# Patient Record
Sex: FEMALE | Race: WHITE | Employment: OTHER | URBAN - METROPOLITAN AREA
[De-identification: names, ages, dates, MRNs, and addresses within clinical notes are randomized per-mention and may not be internally consistent; named-entity substitution may affect disease eponyms.]

---

## 2017-07-17 RX ORDER — SODIUM CHLORIDE, SODIUM LACTATE, POTASSIUM CHLORIDE, CALCIUM CHLORIDE 600; 310; 30; 20 MG/100ML; MG/100ML; MG/100ML; MG/100ML
INJECTION, SOLUTION INTRAVENOUS CONTINUOUS
Status: CANCELLED | OUTPATIENT
Start: 2017-07-17

## 2017-07-19 PROCEDURE — 87081 CULTURE SCREEN ONLY: CPT | Performed by: ORTHOPAEDIC SURGERY

## 2017-07-19 PROCEDURE — 81003 URINALYSIS AUTO W/O SCOPE: CPT | Performed by: FAMILY MEDICINE

## 2017-07-24 NOTE — OR NURSING
Called ,pcp office and spoke to Casa  Requested confirmed EKG tracing done 7/19/17 be faxed as soon as available.

## 2017-07-26 ENCOUNTER — APPOINTMENT (OUTPATIENT)
Dept: GENERAL RADIOLOGY | Facility: HOSPITAL | Age: 67
DRG: 457 | End: 2017-07-26
Attending: ORTHOPAEDIC SURGERY
Payer: MEDICARE

## 2017-07-26 ENCOUNTER — HOSPITAL ENCOUNTER (INPATIENT)
Facility: HOSPITAL | Age: 67
LOS: 4 days | Discharge: NURSING, REHAB OR LONG-TERM CARE | DRG: 457 | End: 2017-07-30
Attending: ORTHOPAEDIC SURGERY | Admitting: ORTHOPAEDIC SURGERY
Payer: MEDICARE

## 2017-07-26 ENCOUNTER — SURGERY (OUTPATIENT)
Age: 67
End: 2017-07-26

## 2017-07-26 DIAGNOSIS — M48.061 LUMBAR STENOSIS: Primary | ICD-10-CM

## 2017-07-26 LAB
ERYTHROCYTE [DISTWIDTH] IN BLOOD BY AUTOMATED COUNT: 13.6 % (ref 11.5–16)
HCT VFR BLD AUTO: 32.1 % (ref 34–50)
HGB BLD-MCNC: 10.2 G/DL (ref 12–16)
MCH RBC QN AUTO: 28.6 PG (ref 27–33.2)
MCHC RBC AUTO-ENTMCNC: 31.8 G/DL (ref 31–37)
MCV RBC AUTO: 89.9 FL (ref 81–100)
PLATELET # BLD AUTO: 227 10(3)UL (ref 150–450)
RBC # BLD AUTO: 3.57 X10(6)UL (ref 3.8–5.1)
RED CELL DISTRIBUTION WIDTH-SD: 44.4 FL (ref 35.1–46.3)
WBC # BLD AUTO: 14.6 X10(3) UL (ref 4–13)

## 2017-07-26 PROCEDURE — 95940 IONM IN OPERATNG ROOM 15 MIN: CPT | Performed by: ORTHOPAEDIC SURGERY

## 2017-07-26 PROCEDURE — 88304 TISSUE EXAM BY PATHOLOGIST: CPT | Performed by: ORTHOPAEDIC SURGERY

## 2017-07-26 PROCEDURE — 0ST40ZZ RESECTION OF LUMBOSACRAL DISC, OPEN APPROACH: ICD-10-PCS | Performed by: ORTHOPAEDIC SURGERY

## 2017-07-26 PROCEDURE — 76000 FLUOROSCOPY <1 HR PHYS/QHP: CPT | Performed by: ORTHOPAEDIC SURGERY

## 2017-07-26 PROCEDURE — 07DS3ZZ EXTRACTION OF VERTEBRAL BONE MARROW, PERCUTANEOUS APPROACH: ICD-10-PCS | Performed by: ORTHOPAEDIC SURGERY

## 2017-07-26 PROCEDURE — 94664 DEMO&/EVAL PT USE INHALER: CPT

## 2017-07-26 PROCEDURE — 88311 DECALCIFY TISSUE: CPT | Performed by: ORTHOPAEDIC SURGERY

## 2017-07-26 PROCEDURE — 85027 COMPLETE CBC AUTOMATED: CPT | Performed by: PHYSICIAN ASSISTANT

## 2017-07-26 PROCEDURE — 4A11X4G MONITORING OF PERIPHERAL NERVOUS ELECTRICAL ACTIVITY, INTRAOPERATIVE, EXTERNAL APPROACH: ICD-10-PCS | Performed by: ORTHOPAEDIC SURGERY

## 2017-07-26 PROCEDURE — 0ST20ZZ RESECTION OF LUMBAR VERTEBRAL DISC, OPEN APPROACH: ICD-10-PCS | Performed by: ORTHOPAEDIC SURGERY

## 2017-07-26 PROCEDURE — 00BY0ZZ EXCISION OF LUMBAR SPINAL CORD, OPEN APPROACH: ICD-10-PCS | Performed by: ORTHOPAEDIC SURGERY

## 2017-07-26 PROCEDURE — 0SG10AJ FUSION OF 2 OR MORE LUMBAR VERTEBRAL JOINTS WITH INTERBODY FUSION DEVICE, POSTERIOR APPROACH, ANTERIOR COLUMN, OPEN APPROACH: ICD-10-PCS | Performed by: ORTHOPAEDIC SURGERY

## 2017-07-26 DEVICE — RELINE MAS RED SCREW 6.5X40 2C: Type: IMPLANTABLE DEVICE | Site: BACK | Status: FUNCTIONAL

## 2017-07-26 DEVICE — RELINE MAS TI ROD 5.5X85 LRDTC: Type: IMPLANTABLE DEVICE | Status: FUNCTIONAL

## 2017-07-26 DEVICE — BONE GRAFT KIT 7510200 INFUSE SMALL
Type: IMPLANTABLE DEVICE | Status: FUNCTIONAL
Brand: INFUSE® BONE GRAFT

## 2017-07-26 DEVICE — RELINE MAS RED SCREW 7.5X45 2C: Type: IMPLANTABLE DEVICE | Site: BACK | Status: FUNCTIONAL

## 2017-07-26 DEVICE — RELINE LCK SCRW 5.5 OPEN TULIP: Type: IMPLANTABLE DEVICE | Site: BACK | Status: FUNCTIONAL

## 2017-07-26 DEVICE — RELINE MAS MOD SCREW 7.5X45 2C: Type: IMPLANTABLE DEVICE | Site: BACK | Status: FUNCTIONAL

## 2017-07-26 DEVICE — OBLIQUE TLIF 8X10X30MM 4D: Type: IMPLANTABLE DEVICE | Status: FUNCTIONAL

## 2017-07-26 DEVICE — RELINE MAS MOD REDUCTION EXT: Type: IMPLANTABLE DEVICE | Site: BACK | Status: FUNCTIONAL

## 2017-07-26 DEVICE — RELINE MAS RED SCREW 6.5X45 2C: Type: IMPLANTABLE DEVICE | Site: BACK | Status: FUNCTIONAL

## 2017-07-26 DEVICE — RELINE MAS MOD SCREW 6.5X40 2C: Type: IMPLANTABLE DEVICE | Site: BACK | Status: FUNCTIONAL

## 2017-07-26 DEVICE — RELINE MAS MOD SCREW 6.5X45 2C: Type: IMPLANTABLE DEVICE | Site: BACK | Status: FUNCTIONAL

## 2017-07-26 DEVICE — OSTEOCEL PRO BONE MATRIX LG: Type: IMPLANTABLE DEVICE | Site: BACK | Status: FUNCTIONAL

## 2017-07-26 DEVICE — OBLIQUE TLIF 8X10X30MM 12D: Type: IMPLANTABLE DEVICE | Status: FUNCTIONAL

## 2017-07-26 RX ORDER — OXYCODONE HYDROCHLORIDE 5 MG/1
5 TABLET ORAL EVERY 4 HOURS PRN
Status: DISCONTINUED | OUTPATIENT
Start: 2017-07-26 | End: 2017-07-30

## 2017-07-26 RX ORDER — ATENOLOL 25 MG/1
25 TABLET ORAL DAILY
COMMUNITY
End: 2017-12-19

## 2017-07-26 RX ORDER — ONDANSETRON 2 MG/ML
4 INJECTION INTRAMUSCULAR; INTRAVENOUS ONCE
Status: COMPLETED | OUTPATIENT
Start: 2017-07-26 | End: 2017-07-26

## 2017-07-26 RX ORDER — PANTOPRAZOLE SODIUM 40 MG/1
40 TABLET, DELAYED RELEASE ORAL
Status: DISCONTINUED | OUTPATIENT
Start: 2017-07-27 | End: 2017-07-30

## 2017-07-26 RX ORDER — NALOXONE HYDROCHLORIDE 0.4 MG/ML
80 INJECTION, SOLUTION INTRAMUSCULAR; INTRAVENOUS; SUBCUTANEOUS AS NEEDED
Status: DISCONTINUED | OUTPATIENT
Start: 2017-07-26 | End: 2017-07-26 | Stop reason: HOSPADM

## 2017-07-26 RX ORDER — MEPERIDINE HYDROCHLORIDE 25 MG/ML
12.5 INJECTION INTRAMUSCULAR; INTRAVENOUS; SUBCUTANEOUS AS NEEDED
Status: DISCONTINUED | OUTPATIENT
Start: 2017-07-26 | End: 2017-07-26 | Stop reason: HOSPADM

## 2017-07-26 RX ORDER — BUPIVACAINE HYDROCHLORIDE AND EPINEPHRINE 5; 5 MG/ML; UG/ML
INJECTION, SOLUTION EPIDURAL; INTRACAUDAL; PERINEURAL AS NEEDED
Status: DISCONTINUED | OUTPATIENT
Start: 2017-07-26 | End: 2017-07-26 | Stop reason: HOSPADM

## 2017-07-26 RX ORDER — POLYETHYLENE GLYCOL 3350 17 G/17G
17 POWDER, FOR SOLUTION ORAL DAILY PRN
Status: DISCONTINUED | OUTPATIENT
Start: 2017-07-26 | End: 2017-07-30

## 2017-07-26 RX ORDER — HYDROMORPHONE HYDROCHLORIDE 1 MG/ML
0.8 INJECTION, SOLUTION INTRAMUSCULAR; INTRAVENOUS; SUBCUTANEOUS EVERY 2 HOUR PRN
Status: DISCONTINUED | OUTPATIENT
Start: 2017-07-26 | End: 2017-07-30

## 2017-07-26 RX ORDER — HYDROMORPHONE HYDROCHLORIDE 1 MG/ML
0.4 INJECTION, SOLUTION INTRAMUSCULAR; INTRAVENOUS; SUBCUTANEOUS EVERY 2 HOUR PRN
Status: DISCONTINUED | OUTPATIENT
Start: 2017-07-26 | End: 2017-07-30

## 2017-07-26 RX ORDER — ONDANSETRON 2 MG/ML
4 INJECTION INTRAMUSCULAR; INTRAVENOUS AS NEEDED
Status: DISCONTINUED | OUTPATIENT
Start: 2017-07-26 | End: 2017-07-26 | Stop reason: HOSPADM

## 2017-07-26 RX ORDER — FENOFIBRATE 134 MG/1
134 CAPSULE ORAL
COMMUNITY
End: 2017-12-19

## 2017-07-26 RX ORDER — MULTIVITAMIN WITH FOLIC ACID 400 MCG
1 TABLET ORAL DAILY
COMMUNITY

## 2017-07-26 RX ORDER — HYDROMORPHONE HYDROCHLORIDE 1 MG/ML
INJECTION, SOLUTION INTRAMUSCULAR; INTRAVENOUS; SUBCUTANEOUS
Status: COMPLETED
Start: 2017-07-26 | End: 2017-07-26

## 2017-07-26 RX ORDER — DEXAMETHASONE SODIUM PHOSPHATE 4 MG/ML
4 VIAL (ML) INJECTION AS NEEDED
Status: DISCONTINUED | OUTPATIENT
Start: 2017-07-26 | End: 2017-07-26 | Stop reason: HOSPADM

## 2017-07-26 RX ORDER — SODIUM CHLORIDE, SODIUM LACTATE, POTASSIUM CHLORIDE, CALCIUM CHLORIDE 600; 310; 30; 20 MG/100ML; MG/100ML; MG/100ML; MG/100ML
INJECTION, SOLUTION INTRAVENOUS CONTINUOUS
Status: DISCONTINUED | OUTPATIENT
Start: 2017-07-26 | End: 2017-07-26

## 2017-07-26 RX ORDER — DIPHENHYDRAMINE HYDROCHLORIDE 50 MG/ML
12.5 INJECTION INTRAMUSCULAR; INTRAVENOUS AS NEEDED
Status: DISCONTINUED | OUTPATIENT
Start: 2017-07-26 | End: 2017-07-26 | Stop reason: HOSPADM

## 2017-07-26 RX ORDER — HYDROMORPHONE HYDROCHLORIDE 1 MG/ML
0.2 INJECTION, SOLUTION INTRAMUSCULAR; INTRAVENOUS; SUBCUTANEOUS EVERY 2 HOUR PRN
Status: DISCONTINUED | OUTPATIENT
Start: 2017-07-26 | End: 2017-07-30

## 2017-07-26 RX ORDER — MIDAZOLAM HYDROCHLORIDE 1 MG/ML
INJECTION INTRAMUSCULAR; INTRAVENOUS
Status: COMPLETED
Start: 2017-07-26 | End: 2017-07-26

## 2017-07-26 RX ORDER — DOCUSATE SODIUM 100 MG/1
100 CAPSULE, LIQUID FILLED ORAL 2 TIMES DAILY
Status: DISCONTINUED | OUTPATIENT
Start: 2017-07-26 | End: 2017-07-30

## 2017-07-26 RX ORDER — METOCLOPRAMIDE HYDROCHLORIDE 5 MG/ML
10 INJECTION INTRAMUSCULAR; INTRAVENOUS AS NEEDED
Status: DISCONTINUED | OUTPATIENT
Start: 2017-07-26 | End: 2017-07-26 | Stop reason: HOSPADM

## 2017-07-26 RX ORDER — METOCLOPRAMIDE HYDROCHLORIDE 5 MG/ML
5 INJECTION INTRAMUSCULAR; INTRAVENOUS EVERY 6 HOURS PRN
Status: DISCONTINUED | OUTPATIENT
Start: 2017-07-26 | End: 2017-07-30

## 2017-07-26 RX ORDER — SODIUM PHOSPHATE, DIBASIC AND SODIUM PHOSPHATE, MONOBASIC 7; 19 G/133ML; G/133ML
1 ENEMA RECTAL ONCE AS NEEDED
Status: DISCONTINUED | OUTPATIENT
Start: 2017-07-26 | End: 2017-07-30

## 2017-07-26 RX ORDER — BACITRACIN 50000 [USP'U]/1
INJECTION, POWDER, LYOPHILIZED, FOR SOLUTION INTRAMUSCULAR AS NEEDED
Status: DISCONTINUED | OUTPATIENT
Start: 2017-07-26 | End: 2017-07-26 | Stop reason: HOSPADM

## 2017-07-26 RX ORDER — SODIUM CHLORIDE 9 MG/ML
INJECTION, SOLUTION INTRAVENOUS CONTINUOUS
Status: DISCONTINUED | OUTPATIENT
Start: 2017-07-26 | End: 2017-07-30

## 2017-07-26 RX ORDER — HYDROMORPHONE HYDROCHLORIDE 1 MG/ML
0.4 INJECTION, SOLUTION INTRAMUSCULAR; INTRAVENOUS; SUBCUTANEOUS EVERY 5 MIN PRN
Status: DISCONTINUED | OUTPATIENT
Start: 2017-07-26 | End: 2017-07-26 | Stop reason: HOSPADM

## 2017-07-26 RX ORDER — SERTRALINE HYDROCHLORIDE 100 MG/1
100 TABLET, FILM COATED ORAL DAILY
COMMUNITY
End: 2017-12-19

## 2017-07-26 RX ORDER — SERTRALINE HYDROCHLORIDE 100 MG/1
100 TABLET, FILM COATED ORAL DAILY
Status: DISCONTINUED | OUTPATIENT
Start: 2017-07-27 | End: 2017-07-30

## 2017-07-26 RX ORDER — ATENOLOL 25 MG/1
25 TABLET ORAL DAILY
Status: DISCONTINUED | OUTPATIENT
Start: 2017-07-26 | End: 2017-07-30

## 2017-07-26 RX ORDER — BISACODYL 10 MG
10 SUPPOSITORY, RECTAL RECTAL
Status: DISCONTINUED | OUTPATIENT
Start: 2017-07-26 | End: 2017-07-30

## 2017-07-26 RX ORDER — DEXAMETHASONE SODIUM PHOSPHATE 4 MG/ML
10 VIAL (ML) INJECTION EVERY 8 HOURS
Status: COMPLETED | OUTPATIENT
Start: 2017-07-26 | End: 2017-07-27

## 2017-07-26 RX ORDER — LEVOTHYROXINE SODIUM 0.03 MG/1
25 TABLET ORAL
Status: DISCONTINUED | OUTPATIENT
Start: 2017-07-27 | End: 2017-07-30

## 2017-07-26 RX ORDER — SERTRALINE HYDROCHLORIDE 25 MG/1
25 TABLET, FILM COATED ORAL DAILY
Status: DISCONTINUED | OUTPATIENT
Start: 2017-07-27 | End: 2017-07-30

## 2017-07-26 RX ORDER — SERTRALINE HYDROCHLORIDE 25 MG/1
25 TABLET, FILM COATED ORAL DAILY
COMMUNITY
End: 2017-12-19

## 2017-07-26 RX ORDER — GABAPENTIN 600 MG/1
600 TABLET ORAL ONCE
Status: COMPLETED | OUTPATIENT
Start: 2017-07-26 | End: 2017-07-26

## 2017-07-26 RX ORDER — DIPHENHYDRAMINE HCL 25 MG
25 CAPSULE ORAL EVERY 4 HOURS PRN
Status: DISCONTINUED | OUTPATIENT
Start: 2017-07-26 | End: 2017-07-30

## 2017-07-26 RX ORDER — FENOFIBRATE 134 MG/1
134 CAPSULE ORAL
Status: DISCONTINUED | OUTPATIENT
Start: 2017-07-27 | End: 2017-07-30

## 2017-07-26 RX ORDER — MIDAZOLAM HYDROCHLORIDE 1 MG/ML
1 INJECTION INTRAMUSCULAR; INTRAVENOUS EVERY 5 MIN PRN
Status: DISCONTINUED | OUTPATIENT
Start: 2017-07-26 | End: 2017-07-26 | Stop reason: HOSPADM

## 2017-07-26 RX ORDER — ATORVASTATIN CALCIUM 40 MG/1
80 TABLET, FILM COATED ORAL NIGHTLY
Status: DISCONTINUED | OUTPATIENT
Start: 2017-07-26 | End: 2017-07-30

## 2017-07-26 RX ORDER — DIAZEPAM 5 MG/1
5 TABLET ORAL EVERY 8 HOURS PRN
Status: DISCONTINUED | OUTPATIENT
Start: 2017-07-26 | End: 2017-07-28

## 2017-07-26 RX ORDER — ONDANSETRON 2 MG/ML
4 INJECTION INTRAMUSCULAR; INTRAVENOUS EVERY 4 HOURS PRN
Status: ACTIVE | OUTPATIENT
Start: 2017-07-26 | End: 2017-07-27

## 2017-07-26 RX ORDER — DIPHENHYDRAMINE HYDROCHLORIDE 50 MG/ML
25 INJECTION INTRAMUSCULAR; INTRAVENOUS EVERY 4 HOURS PRN
Status: DISCONTINUED | OUTPATIENT
Start: 2017-07-26 | End: 2017-07-30

## 2017-07-26 RX ORDER — ATORVASTATIN CALCIUM 80 MG/1
80 TABLET, FILM COATED ORAL NIGHTLY
COMMUNITY
End: 2017-11-27

## 2017-07-26 RX ORDER — OXYCODONE HYDROCHLORIDE 10 MG/1
10 TABLET ORAL EVERY 4 HOURS PRN
Status: DISCONTINUED | OUTPATIENT
Start: 2017-07-26 | End: 2017-07-30

## 2017-07-26 RX ORDER — CETIRIZINE HYDROCHLORIDE 10 MG/1
10 TABLET ORAL DAILY
Status: DISCONTINUED | OUTPATIENT
Start: 2017-07-27 | End: 2017-07-30

## 2017-07-26 RX ORDER — MORPHINE SULFATE 15 MG/1
15 TABLET ORAL EVERY 4 HOURS PRN
Status: DISCONTINUED | OUTPATIENT
Start: 2017-07-26 | End: 2017-07-30

## 2017-07-26 RX ORDER — LEVOTHYROXINE SODIUM 0.03 MG/1
25 TABLET ORAL
COMMUNITY
End: 2017-12-19

## 2017-07-26 NOTE — BRIEF OP NOTE
Pre-Operative Diagnosis: LUMBAR 3  LUMBAR 4, LUMBAR 4 - LUMBAR 5 STENOSIS, SPONDYLOLISTHESIS KYPHOSIS     Post-Operative Diagnosis: LUMBAR 3  LUMBAR 4, LUMBAR 4 - LUMBAR 5 STENOSIS, SPONDYLOLISTHESIS KYPHOSIS     Procedure Performed:   Procedure(s):  ROMEO

## 2017-07-26 NOTE — PROGRESS NOTES
S: Moderate back pain with no leg pain. No numbness or weakness. No headaches. Inspection:  Awake alert No acute distress. No difficulty breathing     Blood pressure 131/74, pulse 92, temperature 98.7 °F (37.1 °C), temperature source Oral, resp.  rate 1

## 2017-07-26 NOTE — PROGRESS NOTES
NURSING ADMISSION NOTE      Patient admitted via BED  Oriented to room. Safety precautions initiated. Bed in low position. Call light in reach. Drowsy, easily arouseable. Uncomfortable with bed, assisted to lay on side and verbalized relief.  Kendall Leo

## 2017-07-26 NOTE — PROGRESS NOTES
[facility] Note:  Renal Dose Adjustment for Metoclopramide (REGLAN)    Aly Null has been prescribed Metoclopramide (REGLAN) 10 mg every 6 hours as needed for nausea    Estimated Creatinine Clearance: 34.7 mL/min (based on SCr of 1.16 mg/dL).

## 2017-07-26 NOTE — CONSULTS
[facility] Hospitalist Initial Consult       Reason for consult: Medical Management sp L3-S1 TLIF      History of Present Illness: Patient is a 74year old female with PMH sig for HTN, HL, seasonal allergies, hypothyroidism, depression who presents sp L3-S1 TLIF. 2020 unless sxs change  2010: MAMMOGRAM, SCREENING, BILATERAL (CPT=77057)      Comment: had some \"density\" in the right side with                repeat pending  No date: SINUS SURGERY    2010: THIN PREP PAP      Comment: no abnormals.  dr. Tray Louis  No da BUN, CREATSERUM, CA, CAION, MG, PHOS, GLU in the last 72 hours. No results for input(s): ALT, AST, ALB, AMYLASE, LIPASE, LDH in the last 72 hours. Invalid input(s): ALPHOS, TBIL, DBIL, TPROT    No results for input(s): PGLU in the last 72 hours.     Oscar File

## 2017-07-26 NOTE — H&P
Patient presents with:  Pre-Op Exam     Alejandra Lane is a 74year old female who presents for a pre-operative physical exam.   Alejandra Lane is scheduled for a   L3-5 decompression & fusion  procedure at  [facility] on  8/4/17 performed by Dr Mar Archibald.  I ADDITION TO  MG  MG TOTAL Disp: 90 tablet Rfl: 3   atenolol 25 MG Oral Tab TAKE 1 TABLET(25 MG) BY MOUTH EVERY DAY Disp: 90 tablet Rfl: 3   Atorvastatin Calcium 80 MG Oral Tab TAKE ONE TABLET BY MOUTH NIGHTLY Disp: 90 tablet Rfl: 3   fenofibr Symptomatic menopausal or female climacteric states     Allergic rhinitis, cause unspecified     Essential hypertension     Unspecified sinusitis (chronic)     Depressive disorder, not elsewhere classified     Encounter for long-term current use of medicat

## 2017-07-27 ENCOUNTER — APPOINTMENT (OUTPATIENT)
Dept: ULTRASOUND IMAGING | Facility: HOSPITAL | Age: 67
DRG: 457 | End: 2017-07-27
Attending: PHYSICIAN ASSISTANT
Payer: MEDICARE

## 2017-07-27 PROCEDURE — 97530 THERAPEUTIC ACTIVITIES: CPT

## 2017-07-27 PROCEDURE — 93970 EXTREMITY STUDY: CPT | Performed by: PHYSICIAN ASSISTANT

## 2017-07-27 PROCEDURE — 97162 PT EVAL MOD COMPLEX 30 MIN: CPT

## 2017-07-27 PROCEDURE — 97116 GAIT TRAINING THERAPY: CPT

## 2017-07-27 RX ORDER — HYDROCODONE BITARTRATE AND ACETAMINOPHEN 10; 325 MG/1; MG/1
2 TABLET ORAL EVERY 6 HOURS PRN
Status: DISCONTINUED | OUTPATIENT
Start: 2017-07-27 | End: 2017-07-28

## 2017-07-27 RX ORDER — HYDROCODONE BITARTRATE AND ACETAMINOPHEN 10; 325 MG/1; MG/1
1 TABLET ORAL EVERY 6 HOURS PRN
Status: DISCONTINUED | OUTPATIENT
Start: 2017-07-27 | End: 2017-07-28

## 2017-07-27 NOTE — PROGRESS NOTES
S: Moderate back pain with no leg pain. Her strength in her legs is improved. She has numbness in bilateral feet, but this was there pre operatively.   She states she was able to get up into a chair for an hour last night, but had significant pain with tr

## 2017-07-27 NOTE — PHYSICAL THERAPY NOTE
PHYSICAL THERAPY TREATMENT NOTE - INPATIENT    Room Number: 386/386-A     Session: 1  Number of Visits to Meet Established Goals: 5    Presenting Problem: S/p L3-L4,L4-L5,L5-S1 Decompression Instrumented Fusion on 07/26/17    Problem List  Active Problems BASIC MOBILITY  How much difficulty does the patient currently have. ..  -   Turning over in bed (including adjusting bedclothes, sheets and blankets)?: A Little   -   Sitting down on and standing up from a chair with arms (e.g., wheelchair, bedside commode Instrumented Fusion on 07/26/17. Demonstrated some progress with her tolerance to ambulation though remains @ 2 assist level for ambulation with RW. Patient will continue to benefit by P.T.daily to maximize functional independence.     DISCHARGE RECOMMENDATI

## 2017-07-27 NOTE — PROGRESS NOTES
Paged Dr Yamileth Espino for orders for rehab consult. PT/OT recommending Acute rehab on discharge due to buckling and difficulty ambulating when seen this morning. Patient's gait is very unsteady when ambulating.    Discussed with Rios FISHER, order received to consult P

## 2017-07-27 NOTE — CONSULTS
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

## 2017-07-27 NOTE — PROGRESS NOTES
[facility] Hospitalist Progress Note                                                                   Jesus 34  5/23/1950    CC: F/U tanya L3-S1 TLIF    SUBJECTIVE:    Overnight my colleague 3350, magnesium hydroxide, bisacodyl, FLEET ENEMA, ondansetron HCl, Metoclopramide HCl, diphenhydrAMINE **OR** DiphenhydrAMINE HCl, oxyCODONE HCl **OR** oxyCODONE HCl **OR** morphINE Sulfate IR, HYDROmorphone HCl PF **OR** HYDROmorphone HCl PF **OR** HYDRO

## 2017-07-28 LAB
BASOPHILS # BLD AUTO: 0.02 X10(3) UL (ref 0–0.1)
BASOPHILS NFR BLD AUTO: 0.2 %
EOSINOPHIL # BLD AUTO: 0.02 X10(3) UL (ref 0–0.3)
EOSINOPHIL NFR BLD AUTO: 0.2 %
ERYTHROCYTE [DISTWIDTH] IN BLOOD BY AUTOMATED COUNT: 14.2 % (ref 11.5–16)
HCT VFR BLD AUTO: 24.9 % (ref 34–50)
HGB BLD-MCNC: 7.9 G/DL (ref 12–16)
IMMATURE GRANULOCYTE COUNT: 0.07 X10(3) UL (ref 0–1)
IMMATURE GRANULOCYTE RATIO %: 0.7 %
LYMPHOCYTES # BLD AUTO: 1.45 X10(3) UL (ref 0.9–4)
LYMPHOCYTES NFR BLD AUTO: 13.7 %
MCH RBC QN AUTO: 28.5 PG (ref 27–33.2)
MCHC RBC AUTO-ENTMCNC: 31.7 G/DL (ref 31–37)
MCV RBC AUTO: 89.9 FL (ref 81–100)
MONOCYTES # BLD AUTO: 1.37 X10(3) UL (ref 0.1–0.6)
MONOCYTES NFR BLD AUTO: 13 %
NEUTROPHIL ABS PRELIM: 7.64 X10 (3) UL (ref 1.3–6.7)
NEUTROPHILS # BLD AUTO: 7.64 X10(3) UL (ref 1.3–6.7)
NEUTROPHILS NFR BLD AUTO: 72.2 %
PLATELET # BLD AUTO: 183 10(3)UL (ref 150–450)
RBC # BLD AUTO: 2.77 X10(6)UL (ref 3.8–5.1)
RED CELL DISTRIBUTION WIDTH-SD: 46.9 FL (ref 35.1–46.3)
WBC # BLD AUTO: 10.6 X10(3) UL (ref 4–13)

## 2017-07-28 PROCEDURE — 97165 OT EVAL LOW COMPLEX 30 MIN: CPT

## 2017-07-28 PROCEDURE — 97112 NEUROMUSCULAR REEDUCATION: CPT

## 2017-07-28 PROCEDURE — 85025 COMPLETE CBC W/AUTO DIFF WBC: CPT | Performed by: HOSPITALIST

## 2017-07-28 PROCEDURE — 97530 THERAPEUTIC ACTIVITIES: CPT

## 2017-07-28 PROCEDURE — 97116 GAIT TRAINING THERAPY: CPT

## 2017-07-28 PROCEDURE — 97535 SELF CARE MNGMENT TRAINING: CPT

## 2017-07-28 RX ORDER — DIAZEPAM 5 MG/1
5 TABLET ORAL EVERY 6 HOURS PRN
Status: DISCONTINUED | OUTPATIENT
Start: 2017-07-28 | End: 2017-07-30

## 2017-07-28 RX ORDER — HYDROCODONE BITARTRATE AND ACETAMINOPHEN 10; 325 MG/1; MG/1
1 TABLET ORAL EVERY 4 HOURS PRN
Status: DISCONTINUED | OUTPATIENT
Start: 2017-07-28 | End: 2017-07-30

## 2017-07-28 RX ORDER — HYDROCODONE BITARTRATE AND ACETAMINOPHEN 10; 325 MG/1; MG/1
2 TABLET ORAL EVERY 4 HOURS PRN
Status: DISCONTINUED | OUTPATIENT
Start: 2017-07-28 | End: 2017-07-30

## 2017-07-28 NOTE — PROGRESS NOTES
Patient and  ()  attended discharge spine education/snack class. Printed discharge education sheet provided and reviewed. Teach back done. Questions solicited and answered. Tolerated activity well.

## 2017-07-28 NOTE — CONSULTS
PHYSICAL MEDICINE AND REHABILITATION CONSULTATION     CC: Impaired mobility and ADL dysfunction secondary to L3-S1 TLIF    HPI: This is a 74year old female with PMH of HTN/HLD, hypothyroidism, depression, lumbar stenosis with spondylolisthesis, admitted 7 [COMPLETED] CeFAZolin Sodium (ANCEF/KEFZOL) IVPB premix 2 g 2 g Intravenous Q8H   oxyCODONE HCl (OXY-IR) cap/tab 5 mg 5 mg Oral Q4H PRN   Or      OxyCODONE HCl IR (ROXICODONE) immediate release tab 10 mg 10 mg Oral Q4H PRN   Or      morphINE Sulfate IR ( change  2010: MAMMOGRAM, SCREENING, BILATERAL (CPT=77057)      Comment: had some \"density\" in the right side with                repeat pending  No date: SINUS SURGERY    2010: THIN PREP PAP      Comment: no abnormals.  dr. Zari Henley  No date: Indra Bowens build  Head: Normocephalic, atraumatic, without obvious abnormality  Eyes: conjunctiva/lids without erythema/icterus, EOMI  E/N/T: No scars noted on bilateral ears, Lips normal, MMM  Neck: supple, symmetrical, no thyromegaly appreciated  Lungs: Non labored change.      Thank you for this consultation,   Ella Clark, [address] [facility]

## 2017-07-28 NOTE — PROGRESS NOTES
[facility] Hospitalist Progress Note                                                                   Jesus 34  5/23/1950    CC: F/U sp L3-S1 TLIF    SUBJECTIVE:    Attempted to see pt ea hydroxide, bisacodyl, FLEET ENEMA, Metoclopramide HCl, diphenhydrAMINE **OR** DiphenhydrAMINE HCl, oxyCODONE HCl **OR** oxyCODONE HCl **OR** morphINE Sulfate IR, HYDROmorphone HCl PF **OR** HYDROmorphone HCl PF **OR** HYDROmorphone HCl PF, diazepam, CEPACO

## 2017-07-28 NOTE — CM/SW NOTE
07/28/17 1400   CM/SW Referral Data   Referral Source Physician   Reason for Referral Discharge planning   Informant Patient;Spouse   Pertinent Medical Hx   Primary Care Physician Name Laymon Bachelor may   Patient Info   Patient's Mental Status Alert;Oriented

## 2017-07-28 NOTE — CM/SW NOTE
Informed by Donald Tam with [facility] that [facility] bed would be available for pt on Sunday. [facility] is pt's back up plan and has accepted if pt not able to get a bed at [facility] when medically cleared for d/c.

## 2017-07-28 NOTE — OPERATIVE REPORT
[facility]    PATIENT'S NAME: Katherine Casey   ATTENDING PHYSICIAN: Danna Arias M.D. OPERATING PHYSICIAN: Danna Arias M.D.    PATIENT ACCOUNT#:   [de-identified]    LOCATION:  [address] [place] [address]  MEDICAL RECORD #:   [id number]       DATE OF BIRTH: evaluating all neuromonitoring data. All bony prominences were padded. The back was sterilely prepped and draped. AP fluoroscopy was wheeled in. We marked pedicles at all levels outlined above bilaterally.   Two separate incisions 1-1/2 fingerbreadths l electrocautery was used to remove remaining tissue over the pars and facet. Pre-operative measurements documenting a mismatch between pelvic incidence and lumbar lordosis was made, thus documenting clinically significant kyphosis.     Our attention first adherent to the dura was identified and removed with sharp curettes, Kerrisons, and a pituitary. It was sent to Pathology for analysis. Our attention then turned to utilization of the separate contralateral incision.   With a retractor in place we local root was identified and thoroughly decompressed. Contralateral decompression was undertaken thus creating a bilateral decompression and bilateral microforaminotomy and hemilaminotomy using undercutting technique.   With undercutting technique and a Lenora Shen and an articulating arm was attached to the retractor. Bovie electrocautery was used to remove remaining tissue over the pars and facet.   Pre-operative measurements documenting a mismatch between pelvic incidence and lumbar lordosis was made thus document over.  Annulotomy was performed and endplates were prepared. We applied some distraction across the pedicle screws. We sized for a cage.   Allograft complex was impacted into the lateral disc space, and the case was impacted as well, with nerve roots safe their baseline. Needle and Ray-Dina counts were correct at the conclusion of the case.     Dictated By Jamal Monteiro M.D.  d: 07/27/2017 29:00:66  t: 07/27/2017 21:47:22  [facility] [id number]  [facility]/

## 2017-07-28 NOTE — OCCUPATIONAL THERAPY NOTE
OCCUPATIONAL THERAPY EVALUATION - INPATIENT     Room Number: 386/386-A  Evaluation Date: 7/28/2017  Type of Evaluation: Initial  Presenting Problem: s/p L3-L4, L4-L5, L5-S1 decompression instrumented fusion on 7/26/17    Physician Order: IP Consult to [employer] risk    WEIGHT BEARING RESTRICTION  Weight Bearing Restriction: None                PAIN ASSESSMENT  Rating: 3  Location: neck and shoulder area: muscle spasms  Management Techniques: Relaxation (muscle relaxants; warm pack)    COGNITION  WNL    VISION  WN demonstrate log-roll to transfer supine to EOB, supervision level. Patient sat EOB without LOB and no c/o. Donned chair back brace with set-up. Sit to stand from EOB with min A and cues for hand placement.   Patient stood x 4 min without c/o fatigue and skilled inpatient OT to address the above deficits, maximizing patient's ability to return to prior level of function.       Patient is at a high risk for falls at this time and needs skilled intervention from trained staff to provide a safe environment i

## 2017-07-28 NOTE — PROGRESS NOTES
S: Moderate mid back pain with minimal low back pain. No leg pain. Minimal left foot numbness. Patient has no weakness with strength testing. Patient has difficulty walking with her left leg, where she states her leg is going to give out.   Physical the

## 2017-07-28 NOTE — PHYSICAL THERAPY NOTE
PHYSICAL THERAPY TREATMENT NOTE - INPATIENT    Room Number: 386/386-A     Session: 2  Number of Visits to Meet Established Goals: 5    Presenting Problem: S/p L3-L4,L4-L5,L5-S1 Decompression Instrumented Fusion on 07/26/17    Problem List  Active Problems '6-Clicks' INPATIENT SHORT FORM - BASIC MOBILITY  How much difficulty does the patient currently have. ..  -   Turning over in bed (including adjusting bedclothes, sheets and blankets)?: A Little   -   Sitting down on and standing up from a chair with arms aware of session/findings; All patient questions and concerns addressed;SCDs in place; Discussed recommendations with /    ASSESSMENT   Patient is 74years old female S/p L3-L4,L4-L5,L5-S1 Decompression Instrumented Fusion on 07/26/1

## 2017-07-28 NOTE — CONSULTS
Neurology consult NOTE    The reason for consultation:    Post lumbar disc fusion, trouble walking. HPI:   Patient is a 74year old female who underwent L3-5 TLIF on 7/26. She felt her walking has improving on the daily base.  Per nurse report, when she w waken up in the middle of the night frequently    Stress Concern No    Weight Concern No    Exercise Yes    Self-Exams Yes     Social History Narrative   None on file         Actonel                 Myopathy  Bactrim                 Rash  Hydrochlorothiazi no acute distress. Extremities: No edema    MENTAL STATUS:     Orientation: Orientated to person, place and time.      Memory: Intacted     Attention / Concentration: Normal     Language: Normal     Agnosia/Apraxia:None     Fund of Knowledge:Normal spasm still bothers her a lot. PLAN:     Continue PT, agree for the rehab. Thank you for letting me participate in  care. If you have further questions, please feel free to contact me.     Sincerely,      Yasmeen Quijano MD

## 2017-07-29 LAB
BUN BLD-MCNC: 16 MG/DL (ref 8–20)
CALCIUM BLD-MCNC: 8.5 MG/DL (ref 8.3–10.3)
CHLORIDE: 106 MMOL/L (ref 101–111)
CO2: 31 MMOL/L (ref 22–32)
CREAT BLD-MCNC: 0.92 MG/DL (ref 0.55–1.02)
ERYTHROCYTE [DISTWIDTH] IN BLOOD BY AUTOMATED COUNT: 14.3 % (ref 11.5–16)
GLUCOSE BLD-MCNC: 129 MG/DL (ref 70–99)
HAV IGM SER QL: 2.2 MG/DL (ref 1.7–3)
HCT VFR BLD AUTO: 24.9 % (ref 34–50)
HGB BLD-MCNC: 7.8 G/DL (ref 12–16)
MCH RBC QN AUTO: 28.5 PG (ref 27–33.2)
MCHC RBC AUTO-ENTMCNC: 31.3 G/DL (ref 31–37)
MCV RBC AUTO: 90.9 FL (ref 81–100)
PLATELET # BLD AUTO: 191 10(3)UL (ref 150–450)
POTASSIUM SERPL-SCNC: 4 MMOL/L (ref 3.6–5.1)
RBC # BLD AUTO: 2.74 X10(6)UL (ref 3.8–5.1)
RED CELL DISTRIBUTION WIDTH-SD: 47.2 FL (ref 35.1–46.3)
SODIUM SERPL-SCNC: 142 MMOL/L (ref 136–144)
WBC # BLD AUTO: 9.4 X10(3) UL (ref 4–13)

## 2017-07-29 PROCEDURE — 97535 SELF CARE MNGMENT TRAINING: CPT

## 2017-07-29 PROCEDURE — 97530 THERAPEUTIC ACTIVITIES: CPT

## 2017-07-29 PROCEDURE — 97116 GAIT TRAINING THERAPY: CPT

## 2017-07-29 PROCEDURE — 80048 BASIC METABOLIC PNL TOTAL CA: CPT | Performed by: INTERNAL MEDICINE

## 2017-07-29 PROCEDURE — 83735 ASSAY OF MAGNESIUM: CPT | Performed by: INTERNAL MEDICINE

## 2017-07-29 PROCEDURE — 85027 COMPLETE CBC AUTOMATED: CPT | Performed by: INTERNAL MEDICINE

## 2017-07-29 NOTE — OCCUPATIONAL THERAPY NOTE
OCCUPATIONAL THERAPY TREATMENT NOTE - INPATIENT     Room Number: 386/386-A  Session: 1  Number of Visits to Meet Established Goals: 7    Presenting Problem: s/p L3-L4, L4-L5, L5-S1 decompression instrumented fusion on 7/26/17    History related to current Bathing (including washing, rinsing, drying)?: A Little  -   Toileting, which includes using toilet, bedpan or urinal? : A Little  -   Putting on and taking off regular upper body clothing?: None  -   Taking care of personal grooming such as brushing teeth tasks. Pt has met all IP OT goals and is d/c from IP OT at this time. Pt is at sup level in basic ADLS , has appropriate support in place at home, and has been provided with recommendations for any needed AE.    Per AM- PAC, pt with 32.79% impairment in b

## 2017-07-29 NOTE — PHYSICAL THERAPY NOTE
PHYSICAL THERAPY TREATMENT NOTE - INPATIENT    Room Number: 386/386-A     Session: 3  Number of Visits to Meet Established Goals: 5    Presenting Problem: S/p L3-L4,L4-L5,L5-S1 Decompression Instrumented Fusion on 07/26/17    Problem List  Active Problems None   -   Sitting down on and standing up from a chair with arms (e.g., wheelchair, bedside commode, etc.): A Little   -   Moving from lying on back to sitting on the side of the bed?: A Little   How much help from another person does the patient currentl Patient is 74years old female S/p L3-L4,L4-L5,L5-S1 Decompression Instrumented Fusion on 07/26/17. Functional mobilities are limited with pain on the LB area radiating to the B LE thighs when up and waling and after navigating stairs.  Pt is really motiv

## 2017-07-29 NOTE — PROGRESS NOTES
S: She had bad back pain and spasm. She has been getting her Norco.  She is sleepy this morning. She was seen by Neuro and no extensive work up needed at present. She has what sounds like a paresthesia in the left foot.   She has been seen by reyna/erin hammond

## 2017-07-29 NOTE — PROGRESS NOTES
Neuro Progress Note     SUBJECTIVE:  Interval History: has complaints feels a little better being up today. No worsening in her left leg.  ..     Medications:    Current Facility-Administered Medications:  diazepam (VALIUM) tab 5 mg 5 mg Oral Q6H PRN   HYDR 100 mg Oral Daily   Sertraline HCl (ZOLOFT) tab 25 mg 25 mg Oral Daily   Pantoprazole Sodium (PROTONIX) EC tab 40 mg 40 mg Oral BID AC         OBJECTIVE:  Vital signs in last 24 hours:  /54 (BP Location: Left arm)   Pulse 75   Temp 98.2 °F (36.8 °C)

## 2017-07-29 NOTE — PROGRESS NOTES
[facility] Hospitalist Progress Note                                                                   Jesus 34  5/23/1950    CC: F/U sp L3-S1 TLIF    SUBJECTIVE:    Pt feeling better, spa HYDROcodone-acetaminophen, PEG 3350, magnesium hydroxide, bisacodyl, FLEET ENEMA, Metoclopramide HCl, diphenhydrAMINE **OR** DiphenhydrAMINE HCl, oxyCODONE HCl **OR** oxyCODONE HCl **OR** morphINE Sulfate IR, HYDROmorphone HCl PF **OR** HYDROmorphone HCl P

## 2017-07-30 VITALS
BODY MASS INDEX: 26.66 KG/M2 | DIASTOLIC BLOOD PRESSURE: 41 MMHG | HEIGHT: 60 IN | HEART RATE: 54 BPM | OXYGEN SATURATION: 95 % | WEIGHT: 135.81 LBS | SYSTOLIC BLOOD PRESSURE: 95 MMHG | TEMPERATURE: 98 F | RESPIRATION RATE: 20 BRPM

## 2017-07-30 LAB
BUN BLD-MCNC: 12 MG/DL (ref 8–20)
CALCIUM BLD-MCNC: 8.2 MG/DL (ref 8.3–10.3)
CHLORIDE: 105 MMOL/L (ref 101–111)
CO2: 31 MMOL/L (ref 22–32)
CREAT BLD-MCNC: 0.8 MG/DL (ref 0.55–1.02)
ERYTHROCYTE [DISTWIDTH] IN BLOOD BY AUTOMATED COUNT: 13.8 % (ref 11.5–16)
GLUCOSE BLD-MCNC: 143 MG/DL (ref 70–99)
HAV IGM SER QL: 2.3 MG/DL (ref 1.7–3)
HCT VFR BLD AUTO: 24 % (ref 34–50)
HGB BLD-MCNC: 7.6 G/DL (ref 12–16)
HGB BLD-MCNC: 8 G/DL (ref 12–16)
MCH RBC QN AUTO: 28.3 PG (ref 27–33.2)
MCHC RBC AUTO-ENTMCNC: 31.7 G/DL (ref 31–37)
MCV RBC AUTO: 89.2 FL (ref 81–100)
PLATELET # BLD AUTO: 202 10(3)UL (ref 150–450)
POTASSIUM SERPL-SCNC: 3.8 MMOL/L (ref 3.6–5.1)
RBC # BLD AUTO: 2.69 X10(6)UL (ref 3.8–5.1)
RED CELL DISTRIBUTION WIDTH-SD: 44.7 FL (ref 35.1–46.3)
SODIUM SERPL-SCNC: 142 MMOL/L (ref 136–144)
WBC # BLD AUTO: 6.7 X10(3) UL (ref 4–13)

## 2017-07-30 PROCEDURE — 85027 COMPLETE CBC AUTOMATED: CPT | Performed by: INTERNAL MEDICINE

## 2017-07-30 PROCEDURE — 85018 HEMOGLOBIN: CPT | Performed by: INTERNAL MEDICINE

## 2017-07-30 PROCEDURE — 83735 ASSAY OF MAGNESIUM: CPT | Performed by: INTERNAL MEDICINE

## 2017-07-30 PROCEDURE — 97116 GAIT TRAINING THERAPY: CPT

## 2017-07-30 PROCEDURE — 80048 BASIC METABOLIC PNL TOTAL CA: CPT | Performed by: INTERNAL MEDICINE

## 2017-07-30 PROCEDURE — 97530 THERAPEUTIC ACTIVITIES: CPT

## 2017-07-30 RX ORDER — PANTOPRAZOLE SODIUM 40 MG/1
40 TABLET, DELAYED RELEASE ORAL
Qty: 60 TABLET | Refills: 0 | Status: SHIPPED | OUTPATIENT
Start: 2017-07-30 | End: 2017-12-19 | Stop reason: ALTCHOICE

## 2017-07-30 NOTE — PROGRESS NOTES
[facility] Hospitalist Progress Note                                                                   Jesus 34  5/23/1950    CC: F/U sp L3-S1 TLIF    SUBJECTIVE:    Pt laying in bed, on 2 0547     PRN: diazepam, HYDROcodone-acetaminophen **OR** HYDROcodone-acetaminophen, PEG 3350, magnesium hydroxide, bisacodyl, FLEET ENEMA, Metoclopramide HCl, diphenhydrAMINE **OR** DiphenhydrAMINE HCl, oxyCODONE HCl **OR** oxyCODONE HCl **OR** morphINE Chandra

## 2017-07-30 NOTE — CM/SW NOTE
Final dc orders received. marlena confirmed bed at [facility]. Pt will go to rm 2209. RN to call report to [phone]. marlena arranged [facility] for 530pm. PCS form completed.

## 2017-07-30 NOTE — CM/SW NOTE
07/30/17 1400   Discharge disposition   Discharged to: [address] [place] [address]   Name of Facillity/Home Care/Hospice Jesus SCHWART[facility]   Discharge transportation [facility]  ([id number])     Pt will go to [address] [place] [address].  RN to call report to [phone]

## 2017-07-30 NOTE — CM/SW NOTE
sw spoke to [facility] regarding admission. They would like to have pts hemoglobin addressed as she has been trending dolly over the last three days and their normal cutoff is 8.0. They have a bed for pt today if still able to DC today.

## 2017-07-30 NOTE — PHYSICAL THERAPY NOTE
PHYSICAL THERAPY TREATMENT NOTE - INPATIENT    Room Number: 386/386-A     Session: 4  Number of Visits to Meet Established Goals: 5    Presenting Problem: S/p L3-L4,L4-L5,L5-S1 Decompression Instrumented Fusion on 07/26/17    Problem List  Active Problems None   -   Sitting down on and standing up from a chair with arms (e.g., wheelchair, bedside commode, etc.): None   -   Moving from lying on back to sitting on the side of the bed?: None   How much help from another person does the patient currently need. Rudi Santillan S/p L3-L4,L4-L5,L5-S1 Decompression Instrumented Fusion on 07/26/17. Functional mobilities are limited with pain on the LB area radiating to the RLE, and poor safety due to constant drowsy state causing pt to close eyes frequently.   She is mod I in her bed

## 2017-07-31 NOTE — PLAN OF CARE
Patient ready to discharge to [place] [address]. Report given to RN from [place] [address], reviewed plan of care and medications, verbalized understanding. Pt discharged to [place] [address] via [address] with belongings accompanied by spouse.

## 2017-08-04 NOTE — CDS QUERY
Spinal Alignment  [facility]  Dear Dr. Varela Bound:  Clinical documentation (provided below) suggests a finding associated with Spinal Alignment.  For accurate ICD-10-CM code assignment to reflect severity of illness and risk of mort

## 2017-08-18 NOTE — DISCHARGE SUMMARY
[facility]  Discharge Summary    Renee Jamil Patient Status:  Inpatient    1950 MRN [id number]   [facility] 3SW-A Attending No att. providers found   [facility] Day # 4 PCP Naseem Suggs May, DO     Date of Admission: 2017    Date medications    Pantoprazole Sodium 40 MG Oral Tab EC  Take 1 tablet (40 mg total) by mouth 2 (two) times daily before meals. , Normal, Disp-60 tablet, R-0      CONTINUE these medications which have NOT CHANGED    atenolol 25 MG Oral Tab  Take 25 mg by mouth

## 2017-09-21 PROBLEM — M54.50 LOW BACK PAIN: Status: ACTIVE | Noted: 2017-09-21

## 2017-10-11 PROBLEM — M81.0 OSTEOPOROSIS: Status: ACTIVE | Noted: 2017-10-11

## 2017-10-11 PROCEDURE — 83970 ASSAY OF PARATHORMONE: CPT | Performed by: PHYSICIAN ASSISTANT

## 2017-12-19 PROCEDURE — 87624 HPV HI-RISK TYP POOLED RSLT: CPT | Performed by: FAMILY MEDICINE

## 2017-12-19 PROCEDURE — 88175 CYTOPATH C/V AUTO FLUID REDO: CPT | Performed by: FAMILY MEDICINE

## 2018-01-01 PROBLEM — Z98.1 S/P LUMBAR FUSION: Status: ACTIVE | Noted: 2018-01-01

## 2018-04-11 PROBLEM — E11.9 TYPE 2 DIABETES MELLITUS WITHOUT COMPLICATION, WITHOUT LONG-TERM CURRENT USE OF INSULIN (HCC): Status: ACTIVE | Noted: 2018-04-11

## 2018-12-20 PROBLEM — E03.9 ACQUIRED HYPOTHYROIDISM: Status: ACTIVE | Noted: 2018-12-20

## 2020-02-12 ENCOUNTER — HOSPITAL ENCOUNTER (OUTPATIENT)
Dept: GENERAL RADIOLOGY | Facility: HOSPITAL | Age: 70
Discharge: HOME | End: 2020-02-12
Attending: OTOLARYNGOLOGY
Payer: MEDICARE

## 2020-02-12 DIAGNOSIS — K21.9 LARYNGOPHARYNGEAL REFLUX (LPR): ICD-10-CM

## 2020-02-12 DIAGNOSIS — R13.12 OROPHARYNGEAL DYSPHAGIA: ICD-10-CM

## 2020-02-12 PROCEDURE — 74220 X-RAY XM ESOPHAGUS 1CNTRST: CPT | Performed by: OTOLARYNGOLOGY

## (undated) DEVICE — SUTURE VICRYL 1 OS-6

## (undated) DEVICE — NVM5 NEEDLE MODULE M/E

## (undated) DEVICE — UNDYED BRAIDED (POLYGLACTIN 910), SYNTHETIC ABSORBABLE SUTURE: Brand: COATED VICRYL

## (undated) DEVICE — 3M™ MICROFOAM™ TAPE 1528-4: Brand: 3M™ MICROFOAM™

## (undated) DEVICE — C-ARMOR C-ARM EQUIPMENT COVERS CLEAR STERILE UNIVERSAL FIT 12 PER CASE: Brand: C-ARMOR

## (undated) DEVICE — DRAPE TABLE COVER 44X90 TC-10

## (undated) DEVICE — SHEET,DRAPE,70X100,STERILE: Brand: MEDLINE

## (undated) DEVICE — GOWN SURG AERO CHROME XXL

## (undated) DEVICE — DRILL SRG OIL CRTDG MAESTRO

## (undated) DEVICE — COVER,TABLE,HVY DUTY,EXT,77"X96",STRL: Brand: MEDLINE

## (undated) DEVICE — KENDALL SCD EXPRESS SLEEVES, THIGH LENGTH, MEDIUM: Brand: KENDALL SCD

## (undated) DEVICE — LAMINECTOMY CDS: Brand: MEDLINE INDUSTRIES, INC.

## (undated) DEVICE — WIRE FX PRCPT KRSH BVL BLNT

## (undated) DEVICE — SOL  .9 1000ML BTL

## (undated) DEVICE — LAPAROTOMY SPONGE - RF AND X-RAY DETECTABLE PRE-WASHED: Brand: SITUATE

## (undated) DEVICE — GRAFT DELIVERY SYS MODULE

## (undated) DEVICE — LIGHT HANDLE

## (undated) DEVICE — FLOSEAL SEALENT STERILE 10ML

## (undated) DEVICE — NV CLIP DSC&IN-LINE ACTIVATOR

## (undated) DEVICE — GLOVE SURG TRIUMPH SZ 71/2

## (undated) DEVICE — GLOVE SURG TRIUMPH SZ 7

## (undated) DEVICE — MAXCESS MAS TLIF 2 KIT ACCESS

## (undated) DEVICE — TRANSPOSAL ULTRAFLEX DUO/QUAD ULTRA CART MANIFOLD

## (undated) DEVICE — NV I-PAS III DIAMOND TIP

## (undated) DEVICE — BLDE PRCPT FSCL SPLTR DISP

## (undated) DEVICE — NUVAMAP O.R. TECHNOLOGY

## (undated) DEVICE — RELINE POWER

## (undated) DEVICE — DRAPE C-ARM UNIVERSAL

## (undated) DEVICE — 3.0MM PRECISION NEURO (MATCH HEAD)

## (undated) DEVICE — NVM5 NEEDLE MODULE S

## (undated) DEVICE — SUTURE VICRYL 2-0 FSL

## (undated) DEVICE — WRAP COOLING BACK W/NO PILLOW

## (undated) DEVICE — Device

## (undated) NOTE — IP AVS SNAPSHOT
Patient Demographics     Address  [address] [place] [address]  Jeovany Damir [address] Phone  [phone] [facility]  [phone] [facility] E-mail Address  [email or web address]      Emergency Contact(s)     Name Relation Home Work Mobile    Yoselin Spouse 708- Changes as directed by your surgeon  ? Always wash hands before and after dressing changes. DRY GAUZE DRESSING  ? Change dressing daily using dry sterile gauze and paper tape. ?  Watch incision for any redness, drainage, increased warmth or opening of t ? Chewing tobacco, nicotine gum or patches will also inhibit healing. Pain Management  ? Relaxation techniques  o A way to focus your attention other than on your pain. Your brain makes endorphins that are a natural body chemical that can decrease pain. ? become short of breath  ? have chest pain  ? cough up blood  ? have unexplained anxiety with breathing    Follow-up Information     May, Eileen Truong, DO. Schedule an appointment as soon as possible for a visit in 1 week.     Specialty:  [address] Take 25 mg by mouth daily. In addition to sertraline 100mg. Total dose 125mg daily. TAB-A-REGINO Tabs      Take 1 tablet by mouth daily.                 Where to Get Your Medications      These medications were sent to [address] [place] [address] Flowsheet Row Most Recent Value   Vitals  95/41 Filed at 07/30/2017 1600   Pulse  54 Filed at 07/30/2017 1600   Resp  20 Filed at 07/30/2017 1600   Temp  98.3 °F (36.8 °C) Filed at 07/30/2017 1600   SpO2  95 % Filed at 07/30/2017 1600      Patient's Most R Ordering provider:   Peg Hays MD  07/29/17 8675 Resulting lab:  [facility]    Specimen Information    Type Source Collected On   Blood — 07/30/17 5075          Components    Component Value Reference Range Flag Lab   Magnesium 2.3 1.7 - 3.0 mg/d She  has had previous anesthesia:  Yes. Previous complications:   Yes     Smoking status: Former Smoker                                                              Packs/day: 0.00      Years: 0.00         Quit date: 6/20/2008  Smokeless tobacco: Former Us MORNING BEFORE BREAKFAST Disp: 90 capsule Rfl: 3   Fexofenadine HCl (ALLEGRA) 180 MG Oral Tab Take 180 mg by mouth daily. Disp:  Rfl:    Calcium Carbonate-Vit D-Min (CALCIUM 600+D PLUS MINERALS) 600-400 MG-UNIT Oral Chew Tab Chew  by mouth daily.  Disp:  Rf Diagnosis Date   • Cataract    • Chronic sinusitis     levaquin works.    • DEPRESSION     on medications   • Disorder of thyroid    • High blood pressure    • High cholesterol    • Osteopenia    • Other specified menopausal and postmenopausal disorder HYDROcodone-acetaminophen (NORCO)  MG per tab 2 tablet 2 tablet Oral Q6H PRN   [COMPLETED] gabapentin (NEURONTIN) tab 600 mg 600 mg Oral Once   [COMPLETED] ondansetron HCl (ZOFRAN) injection 4 mg 4 mg Intravenous Once   [] sodium chloride 0.9% breakfast   cetirizine (ZYRTEC) tab 10 mg 10 mg Oral Daily   Levothyroxine Sodium (SYNTHROID, LEVOTHROID) tab 25 mcg 25 mcg Oral Before breakfast   Sertraline HCl (ZOLOFT) tab 100 mg 100 mg Oral Daily   Sertraline HCl (ZOLOFT) tab 25 mg 25 mg Oral Daily -   Toileting, which includes using toilet, bedpan or urinal? : A Little  -   Putting on and taking off regular upper body clothing?: A Little  -   Taking care of personal grooming such as brushing teeth?: None  -   Eating meals?: None    Patient was able WBC 10.6 07/28/2017   HGB 7.9 07/28/2017   HCT 24.9 07/28/2017   .0 07/28/2017[SP.2]       ASSESSMENT:    REHAB DIAGNOSIS:  1. Impaired mobility/ADL dysfunction secondary to L3-S1 TLIF  2. Gait dysfunction with fall risk  3. HTN/HLD  4. Hypothyroidis PHYSICAL THERAPY TREATMENT NOTE - INPATIENT    Room Number: 386/386-A     Session: 4  Number of Visits to Meet Established Goals: 5    Presenting Problem: S/p L3-L4,L4-L5,L5-S1 Decompression Instrumented Fusion on 07/26/17    Problem List  Active Problems blankets)?: None   -   Sitting down on and standing up from a chair with arms (e.g., wheelchair, bedside commode, etc.): None   -   Moving from lying on back to sitting on the side of the bed?: None   How much help from another person does the patient curr Patient is 74years old female S/p L3-L4,L4-L5,L5-S1 Decompression Instrumented Fusion on 07/26/17.  Functional mobilities are limited with pain on the LB area radiating to the RLE, and poor safety due to constant drowsy state causing pt to close eyes frequ :  Feliz Paul PT (Physical Therapist)        PHYSICAL THERAPY TREATMENT NOTE - INPATIENT    Room Number: 386/386-A     Session: 3  Number of Visits to Meet Established Goals: 5    Presenting Problem: S/p L3-L4,L4-L5,L5-S1 Decompression I -   Turning over in bed (including adjusting bedclothes, sheets and blankets)?: None   -   Sitting down on and standing up from a chair with arms (e.g., wheelchair, bedside commode, etc.): A Little   -   Moving from lying on back to sitting on the side of aware of session/findings; All patient questions and concerns addressed;SCDs in place; Family present    ASSESSMENT   Patient is 74years old female S/p L3-L4,L4-L5,L5-S1 Decompression Instrumented Fusion on 07/26/17.  Functional mobilities are limited with p Physical Therapy Note signed by Mirtha Parikh PT at 7/28/2017 10:47 AM  Version 1 of 1    Author:  Mirtha Parikh PT Service:  Rehab Author Type:  Physical Therapist    Filed:  7/28/2017 10:47 AM Date of Service:  7/28/2017 10:42 AM Status:  Signed techniques;Relaxation;Repositioning    BALANCE                                                                                                                     Static Sitting: Good  Dynamic Sitting: Fair +           Static Standing: Poor  Dynamic Standi following & max/ mod assist, needed 2 standing rest breaks & 2 seated rest breaks due to incoordination of left LE, decreased awareness of left LE placement & left ankle & foot buckling together with lumbo pelvic instability.     THERAPEUTIC EXERCISES  Nell Vo Occupational Therapy Note signed by Thai Leary OT at 7/29/2017  2:13 PM  Version 1 of 1    Author:  Thai Leary OT Service:  (none) Author Type:  Occupational Therapist    Filed:  7/29/2017  2:13 PM Date of Service:  7/29/2017  2:06 PM Status:  Signed O2 Saturation: 97%  Liters of O2:  2L  Heart Rate: 80  Blood Pressure: 97/56    ACTIVITIES OF DAILY LIVING ASSESSMENT  AM-PAC ‘6-Clicks’ Inpatient Daily Activity Short Form  How much help from another person does the patient currently need…  -   Putting on Patient End of Session: In bed;Needs met;Call light within reach; Ice applied;SCDs in place; All patient questions and concerns addressed; Family present    ASSESSMENT   Pt seen for OT services.  In this session, pt made significant progress towards OT goals w level which is available in home 7/29  Patient will transfer to shower:  with supervision, with good judgement/safety and maintaining back safety precautions- Met 7/29[MR.1]       Attribution Humphrey    MR.1 Alex Menchaca OT on 7/29/2017  2:06 PM Comment: no abnormals.  dr. Rosi Harrell  No date: TONSILLECTOMY  2010: XR DEXA BONE DENSITY AXIAL (CPT=77080)[AO.2]    HOME SITUATION[AO.1]  Type of Home: House  Home Layout: Two level  Lives With: Spouse    Toilet and Equipment: Comfort height toilet  Roseanne -   Bathing (including washing, rinsing, drying)?: A Little  -   Toileting, which includes using toilet, bedpan or urinal? : A Little  -   Putting on and taking off regular upper body clothing?: A Little  -   Taking care of personal grooming such as Sylvia Salvage L3-L4,L4-L5,L5-S1 Decompression Instrumented Fusion on 07/26/17. In this OT evaluation patient presents with the following impairments: decreased endurance, balance, proprioception, knowledge regarding adaptive techniques and equipment.   The AM-WERNER ' ‘6-C Patient will perform lower body dressing:  with modified independent, with adaptive equipment PRN and while maintaining back safety precautions  Patient will perform toileting: with modified independent    Functional Transfer Goals  Patient will transfer f

## (undated) NOTE — IP AVS SNAPSHOT
[address]            (For Outpatient Use Only) Initial Admit Date: 7/26/2017   Inpt/Obs Admit Date: Inpt: 7/26/17 / Obs: N/A   Discharge Date:    Joselo Rees:  [de-identified]   MRN: [de-identified]   CSN: [id number]        ENCOUNTER  Patient Hospital Account Financial Class: Medicare    July 30, 2017

## (undated) NOTE — LETTER
[facility] Testing Department  Phone: [phone]  Right Fax: [phone] [address] [facility] By:  Christina Henson RN Date: 7/17/17    Patient Name: Arnoldo Flores  Surgery Date: 8/4/2017    CSN: [id number]  Medical Record: [id number] and confidential.  If the reader of this message is not the intended recipient, you are hereby notified that any disclosure, distribution, or copying of this information is strictly prohibited.   If you have received this transmission in error, please notif

## (undated) NOTE — LETTER
[facility]  Nyasia Corea 63 [address] [facility], [address] [place] [address]    Consent for Operation    Date: __________________    Time: _______________    1.  I authorize the performance upon Camila Prader the following operation:    Procedure(s):  LUMBAR 3-LUMBAR 4, procedure has been videotaped, the surgeon will obtain the original videotape. The hospital will not be responsible for storage or maintenance of this tape.     6. For the purpose of advancing medical education, I consent to the admittance of observers to t STATEMENTS REQUIRING INSERTION OR COMPLETION WERE FILLED IN.     Signature of Patient:   ___________________________    When the patient is a minor or mentally incompetent to give consent:  Signature of person authorized to consent for patient: ____________ drugs/illegal medications). Failure to inform my anesthesiologist about these medicines may increase my risk of anesthetic complications. · If I am allergic to anything or have had a reaction to anesthesia before.     3. I understand how the anesthesia med I have discussed the procedure and information above with the patient (or patient’s representative) and answered their questions. The patient or their representative has agreed to have anesthesia services.     _______________________________________________